# Patient Record
Sex: MALE | Race: WHITE
[De-identification: names, ages, dates, MRNs, and addresses within clinical notes are randomized per-mention and may not be internally consistent; named-entity substitution may affect disease eponyms.]

---

## 2020-01-25 ENCOUNTER — HOSPITAL ENCOUNTER (INPATIENT)
Dept: HOSPITAL 88 - ICU | Age: 60
LOS: 4 days | Discharge: HOME | DRG: 308 | End: 2020-01-29
Attending: INTERNAL MEDICINE | Admitting: INTERNAL MEDICINE
Payer: MEDICARE

## 2020-01-25 VITALS — WEIGHT: 191 LBS | BODY MASS INDEX: 28.29 KG/M2 | HEIGHT: 69 IN

## 2020-01-25 VITALS — SYSTOLIC BLOOD PRESSURE: 135 MMHG | DIASTOLIC BLOOD PRESSURE: 84 MMHG

## 2020-01-25 VITALS — SYSTOLIC BLOOD PRESSURE: 132 MMHG | DIASTOLIC BLOOD PRESSURE: 80 MMHG

## 2020-01-25 VITALS — SYSTOLIC BLOOD PRESSURE: 128 MMHG | DIASTOLIC BLOOD PRESSURE: 106 MMHG

## 2020-01-25 VITALS — DIASTOLIC BLOOD PRESSURE: 103 MMHG | SYSTOLIC BLOOD PRESSURE: 123 MMHG

## 2020-01-25 VITALS — DIASTOLIC BLOOD PRESSURE: 82 MMHG | SYSTOLIC BLOOD PRESSURE: 121 MMHG

## 2020-01-25 DIAGNOSIS — Z88.5: ICD-10-CM

## 2020-01-25 DIAGNOSIS — Z88.0: ICD-10-CM

## 2020-01-25 DIAGNOSIS — I13.0: ICD-10-CM

## 2020-01-25 DIAGNOSIS — Z87.891: ICD-10-CM

## 2020-01-25 DIAGNOSIS — I48.0: Primary | ICD-10-CM

## 2020-01-25 DIAGNOSIS — E78.5: ICD-10-CM

## 2020-01-25 DIAGNOSIS — J18.9: ICD-10-CM

## 2020-01-25 DIAGNOSIS — N18.3: ICD-10-CM

## 2020-01-25 DIAGNOSIS — J40: ICD-10-CM

## 2020-01-25 DIAGNOSIS — Z85.89: ICD-10-CM

## 2020-01-25 DIAGNOSIS — J44.9: ICD-10-CM

## 2020-01-25 DIAGNOSIS — Z86.718: ICD-10-CM

## 2020-01-25 DIAGNOSIS — I50.33: ICD-10-CM

## 2020-01-25 DIAGNOSIS — E03.9: ICD-10-CM

## 2020-01-25 LAB
ALBUMIN SERPL-MCNC: 3.3 G/DL (ref 3.5–5)
ALBUMIN/GLOB SERPL: 1 {RATIO} (ref 0.8–2)
ALP SERPL-CCNC: 70 IU/L (ref 40–150)
ALT SERPL-CCNC: 11 IU/L (ref 0–55)
ANION GAP SERPL CALC-SCNC: 14.9 MMOL/L (ref 8–16)
BASE EXCESS BLDA CALC-SCNC: -6 MMOL/L (ref -2–3)
BASOPHILS # BLD AUTO: 0 10*3/UL (ref 0–0.1)
BASOPHILS NFR BLD AUTO: 0.2 % (ref 0–1)
BUN SERPL-MCNC: 20 MG/DL (ref 7–26)
BUN/CREAT SERPL: 17 (ref 6–25)
CALCIUM SERPL-MCNC: 9.2 MG/DL (ref 8.4–10.2)
CHLORIDE SERPL-SCNC: 106 MMOL/L (ref 98–107)
CHOLEST SERPL-MCNC: 199 MD/DL (ref 0–199)
CHOLEST/HDLC SERPL: 5 {RATIO} (ref 3.9–4.7)
CO2 SERPL-SCNC: 21 MMOL/L (ref 22–29)
DEPRECATED APTT PLAS QN: 28.5 SECONDS (ref 23.8–35.5)
DEPRECATED INR PLAS: 0.86
DEPRECATED NEUTROPHILS # BLD AUTO: 10.8 10*3/UL (ref 2.1–6.9)
EGFRCR SERPLBLD CKD-EPI 2021: > 60 ML/MIN (ref 60–?)
EOSINOPHIL # BLD AUTO: 0 10*3/UL (ref 0–0.4)
EOSINOPHIL NFR BLD AUTO: 0.2 % (ref 0–6)
ERYTHROCYTE [DISTWIDTH] IN CORD BLOOD: 12.8 % (ref 11.7–14.4)
GLOBULIN PLAS-MCNC: 3.2 G/DL (ref 2.3–3.5)
GLUCOSE SERPLBLD-MCNC: 147 MG/DL (ref 74–118)
HCO3 BLDA-SCNC: 19 MMOL/L (ref 23–28)
HCT VFR BLD AUTO: 45.3 % (ref 38.2–49.6)
HDLC SERPL-MSCNC: 40 MG/DL (ref 40–60)
HGB BLD-MCNC: 14.8 G/DL (ref 14–18)
LDLC SERPL CALC-MCNC: 128 MG/DL (ref 60–130)
LYMPHOCYTES # BLD: 0.9 10*3/UL (ref 1–3.2)
LYMPHOCYTES NFR BLD AUTO: 7.5 % (ref 18–39.1)
MAGNESIUM SERPL-MCNC: 1.9 MG/DL (ref 1.3–2.1)
MCH RBC QN AUTO: 30.4 PG (ref 28–32)
MCHC RBC AUTO-ENTMCNC: 32.7 G/DL (ref 31–35)
MCV RBC AUTO: 93 FL (ref 81–99)
MONOCYTES # BLD AUTO: 0.3 10*3/UL (ref 0.2–0.8)
MONOCYTES NFR BLD AUTO: 2.6 % (ref 4.4–11.3)
NEUTS SEG NFR BLD AUTO: 89 % (ref 38.7–80)
PCO2 BLDA: 32 MMHG (ref 41–51)
PCO2 BLDA: 99 MMHG (ref 80–105)
PH BLDA: 7.39 [PH] (ref 7.31–7.41)
PLATELET # BLD AUTO: 225 X10E3/UL (ref 140–360)
POTASSIUM SERPL-SCNC: 3.9 MMOL/L (ref 3.5–5.1)
PROTHROMBIN TIME: 12.2 SECONDS (ref 11.9–14.5)
RBC # BLD AUTO: 4.87 X10E6/UL (ref 4.3–5.7)
SAO2 % BLDA: 98 % (ref 95–98)
SODIUM SERPL-SCNC: 138 MMOL/L (ref 136–145)
TRIGL SERPL-MCNC: 155 MG/DL (ref 0–149)
TSH SERPL DL<=0.005 MIU/L-ACNC: 0.84 UIU/ML (ref 0.35–4.94)

## 2020-01-25 PROCEDURE — 93306 TTE W/DOPPLER COMPLETE: CPT

## 2020-01-25 PROCEDURE — 83036 HEMOGLOBIN GLYCOSYLATED A1C: CPT

## 2020-01-25 PROCEDURE — 80053 COMPREHEN METABOLIC PANEL: CPT

## 2020-01-25 PROCEDURE — 85025 COMPLETE CBC W/AUTO DIFF WBC: CPT

## 2020-01-25 PROCEDURE — 84443 ASSAY THYROID STIM HORMONE: CPT

## 2020-01-25 PROCEDURE — 85610 PROTHROMBIN TIME: CPT

## 2020-01-25 PROCEDURE — 71045 X-RAY EXAM CHEST 1 VIEW: CPT

## 2020-01-25 PROCEDURE — 71260 CT THORAX DX C+: CPT

## 2020-01-25 PROCEDURE — 36415 COLL VENOUS BLD VENIPUNCTURE: CPT

## 2020-01-25 PROCEDURE — 36600 WITHDRAWAL OF ARTERIAL BLOOD: CPT

## 2020-01-25 PROCEDURE — 94640 AIRWAY INHALATION TREATMENT: CPT

## 2020-01-25 PROCEDURE — 80061 LIPID PANEL: CPT

## 2020-01-25 PROCEDURE — 85730 THROMBOPLASTIN TIME PARTIAL: CPT

## 2020-01-25 PROCEDURE — 80048 BASIC METABOLIC PNL TOTAL CA: CPT

## 2020-01-25 PROCEDURE — 93005 ELECTROCARDIOGRAM TRACING: CPT

## 2020-01-25 PROCEDURE — 82805 BLOOD GASES W/O2 SATURATION: CPT

## 2020-01-25 PROCEDURE — 83735 ASSAY OF MAGNESIUM: CPT

## 2020-01-25 RX ADMIN — APIXABAN SCH MG: 5 TABLET, FILM COATED ORAL at 17:00

## 2020-01-25 RX ADMIN — DRONEDARONE SCH MG: 400 TABLET, FILM COATED ORAL at 17:00

## 2020-01-25 RX ADMIN — APIXABAN SCH MG: 5 TABLET, FILM COATED ORAL at 12:46

## 2020-01-25 RX ADMIN — METOPROLOL TARTRATE SCH MG: 25 TABLET, FILM COATED ORAL at 12:46

## 2020-01-25 RX ADMIN — DRONEDARONE SCH MG: 400 TABLET, FILM COATED ORAL at 12:46

## 2020-01-25 RX ADMIN — METOPROLOL TARTRATE SCH MG: 25 TABLET, FILM COATED ORAL at 17:02

## 2020-01-25 RX ADMIN — METOPROLOL TARTRATE SCH MG: 25 TABLET, FILM COATED ORAL at 23:41
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
## 2020-01-25 NOTE — DIAGNOSTIC IMAGING REPORT
EXAMINATION:  CHEST SINGLE (PORTABLE)    



INDICATION:           

^dYSPNEA

^[id number]

^1715 



COMPARISON:  None

     

FINDINGS:

TUBES and LINES:  None.



LUNGS:  Lungs are well inflated.  Lungs are clear.   There is no evidence of

pneumonia or pulmonary edema.



PLEURA:  No pleural effusion or pneumothorax.



HEART AND MEDIASTINUM:  The cardiomediastinal silhouette is unremarkable.    



BONES AND SOFT TISSUES:  No acute osseous lesion.  Soft tissues are

unremarkable.



UPPER ABDOMEN: No free air under the diaphragm.    



IMPRESSION: 

No acute thoracic abnormality.





Signed by: Dr. JESSE York M.D. on 1/25/2020 6:11 PM

## 2020-01-25 NOTE — XMS REPORT
Patient Summary Document

                             Created on: 2020



DELPHINE CRYSTAL

External Reference #: [id number]

: 1960

Sex: Male



Demographics







                          Address                   [address] [place]  [address]

 

                          Home Phone                [phone]

 

                          Preferred Language        Unknown

 

                          Marital Status            Unknown

 

                          Worship Affiliation     Unknown

 

                          Race                      Unknown

 

                          Ethnic Group              Unknown





Author







                          [facility]                    [facility]

 

                          [facility]

 

                          Address                   Unknown

 

                          Phone                     Unavailable







Care Team Providers







                    Care Team Member Name    Role                Phone

 

                          Unavailable               Unavailable







Problems

This patient has no known problems.



Allergies, Adverse Reactions, Alerts

This patient has no known allergies or adverse reactions.



Medications

This patient has no known medications.



Encounters







             Start Date/Time    End Date/Time    Encounter Type    Admission Type    Attending [facility] Facility       Care Department     Encounter ID

 

        2020-01-15 08:25:06    2020-01-15 08:25:06    Outpatient                    [facility]     [id number]

 

        2020-01-15 07:18:30    2020-01-15 07:18:30    Outpatient                    [facility]     [id number]

 

        2020-01-15 00:00:00    2020-01-15 00:00:00    Outpatient                    [facility]     [id number]

 

        2019 10:33:34    2019 10:33:34    Outpatient                    [facility]     [id number]

 

        2019 00:00:00    2019 00:00:00    Outpatient                    [facility]     [id number]

 

        2019 07:30:17    2019 07:30:17    Outpatient                    [facility]     [id number]

 

        2019 12:17:42    2019 12:17:42    Outpatient                    [facility]     [id number]

 

        2019 10:40:40    2019 10:40:40    Outpatient                    [facility]     [id number]

 

        2019 00:00:00    2019 00:00:00    Outpatient                    [facility]     [id number]

 

        2019 12:03:38    2019 12:03:38    Outpatient                    [facility]     [id number]

 

        2019 13:37:14    2019 13:37:14    Outpatient                    [facility]     [id number]

 

        2019 09:02:52    2019 09:02:52    Outpatient                    [facility]     [id number]

 

        2019 08:56:32    2019 08:56:32    Outpatient                    [facility]     [id number]

 

        2019 08:00:29    2019 08:00:29    Outpatient                    [facility]     [id number]

## 2020-01-25 NOTE — DIAGNOSTIC IMAGING REPORT
EXAM: CT Chest WITH contrast 1/25/2020 11:59 AM

INDICATION: Pain. Pulmonary Embolism.. Atrial fibrillation. 

COMPARISON: 9/8/2014.

TECHNIQUE:

Chest was scanned utilizing a multidetector helical scanner from the lung apex

through the level of the adrenal glands without administration of IV contrast.

Coronal and sagittal reformations were obtained. Pulmonary embolism protocol

was performed.

           IV CONTRAST: 100 cc Isovue-300

           RADIATION DOSE: Total DLP: 521.29 mGy*cm

            Estimated effective dose: (DLP x 0.014 x size factor) mSv

           COMPLICATIONS: None



FINDINGS:



LINES/ TUBES: None.



LUNGS AND AIRWAYS: No filling defects within the pulmonary arterial system to

suggest pulmonary embolism as per clinical query. Patchy consolidation in the

left lung base may represent pneumonia in the proper clinical setting. Airways

are normal.



PLEURA: The pleural spaces are clear.



HEART AND MEDIASTINUM: The thyroid gland is normal.  No mediastinal, hilar or

axillary lymphadenopathy.  The heart is normal in size.. There is no

pericardial effusion.     



UPPER ABDOMEN: Limited non-contrast views of the upper abdomen show no

abnormality within the visualized liver, spleen, pancreas, or kidneys. The

adrenal glands are normal.



BONES: Lower thoracic the ICA.



SOFT TISSUES: Unremarkable.



IMPRESSION: 



1. No evidence of pulmonary embolism.



2. Patchy consolidation in the left lung base concerning for pneumonia in the

proper clinical setting





Signed by: Dr. JESSE York M.D. on 1/25/2020 6:29 PM

## 2020-01-26 VITALS — SYSTOLIC BLOOD PRESSURE: 144 MMHG | DIASTOLIC BLOOD PRESSURE: 103 MMHG

## 2020-01-26 VITALS — SYSTOLIC BLOOD PRESSURE: 123 MMHG | DIASTOLIC BLOOD PRESSURE: 80 MMHG

## 2020-01-26 VITALS — DIASTOLIC BLOOD PRESSURE: 102 MMHG | SYSTOLIC BLOOD PRESSURE: 129 MMHG

## 2020-01-26 VITALS — SYSTOLIC BLOOD PRESSURE: 141 MMHG | DIASTOLIC BLOOD PRESSURE: 88 MMHG

## 2020-01-26 VITALS — DIASTOLIC BLOOD PRESSURE: 92 MMHG | SYSTOLIC BLOOD PRESSURE: 112 MMHG

## 2020-01-26 VITALS — SYSTOLIC BLOOD PRESSURE: 132 MMHG | DIASTOLIC BLOOD PRESSURE: 106 MMHG

## 2020-01-26 VITALS — DIASTOLIC BLOOD PRESSURE: 104 MMHG | SYSTOLIC BLOOD PRESSURE: 141 MMHG

## 2020-01-26 RX ADMIN — DRONEDARONE SCH MG: 400 TABLET, FILM COATED ORAL at 16:33

## 2020-01-26 RX ADMIN — APIXABAN SCH MG: 5 TABLET, FILM COATED ORAL at 16:33

## 2020-01-26 RX ADMIN — METOPROLOL TARTRATE SCH MG: 25 TABLET, FILM COATED ORAL at 23:45

## 2020-01-26 RX ADMIN — FAMOTIDINE SCH MG: 20 TABLET, FILM COATED ORAL at 16:33

## 2020-01-26 RX ADMIN — METOPROLOL TARTRATE SCH MG: 25 TABLET, FILM COATED ORAL at 06:06

## 2020-01-26 RX ADMIN — METOPROLOL TARTRATE SCH MG: 25 TABLET, FILM COATED ORAL at 11:22

## 2020-01-26 RX ADMIN — APIXABAN SCH MG: 5 TABLET, FILM COATED ORAL at 08:25

## 2020-01-26 RX ADMIN — DRONEDARONE SCH MG: 400 TABLET, FILM COATED ORAL at 08:25

## 2020-01-26 RX ADMIN — LEVOFLOXACIN SCH MLS/HR: 5 INJECTION, SOLUTION INTRAVENOUS at 11:22
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
## 2020-01-27 VITALS — DIASTOLIC BLOOD PRESSURE: 66 MMHG | SYSTOLIC BLOOD PRESSURE: 101 MMHG

## 2020-01-27 VITALS — SYSTOLIC BLOOD PRESSURE: 127 MMHG | DIASTOLIC BLOOD PRESSURE: 99 MMHG

## 2020-01-27 VITALS — DIASTOLIC BLOOD PRESSURE: 104 MMHG | SYSTOLIC BLOOD PRESSURE: 154 MMHG

## 2020-01-27 VITALS — SYSTOLIC BLOOD PRESSURE: 131 MMHG | DIASTOLIC BLOOD PRESSURE: 88 MMHG

## 2020-01-27 VITALS — SYSTOLIC BLOOD PRESSURE: 101 MMHG | DIASTOLIC BLOOD PRESSURE: 66 MMHG

## 2020-01-27 VITALS — DIASTOLIC BLOOD PRESSURE: 102 MMHG | SYSTOLIC BLOOD PRESSURE: 151 MMHG

## 2020-01-27 LAB
ANION GAP SERPL CALC-SCNC: 12 MMOL/L (ref 8–16)
BASOPHILS # BLD AUTO: 0 10*3/UL (ref 0–0.1)
BASOPHILS NFR BLD AUTO: 0.4 % (ref 0–1)
BUN SERPL-MCNC: 17 MG/DL (ref 7–26)
BUN/CREAT SERPL: 12 (ref 6–25)
CALCIUM SERPL-MCNC: 9 MG/DL (ref 8.4–10.2)
CHLORIDE SERPL-SCNC: 104 MMOL/L (ref 98–107)
CO2 SERPL-SCNC: 29 MMOL/L (ref 22–29)
DEPRECATED NEUTROPHILS # BLD AUTO: 6.6 10*3/UL (ref 2.1–6.9)
EGFRCR SERPLBLD CKD-EPI 2021: 53 ML/MIN (ref 60–?)
EOSINOPHIL # BLD AUTO: 0.2 10*3/UL (ref 0–0.4)
EOSINOPHIL NFR BLD AUTO: 1.8 % (ref 0–6)
ERYTHROCYTE [DISTWIDTH] IN CORD BLOOD: 13.6 % (ref 11.7–14.4)
GLUCOSE SERPLBLD-MCNC: 83 MG/DL (ref 74–118)
HCT VFR BLD AUTO: 44.8 % (ref 38.2–49.6)
HGB BLD-MCNC: 14.3 G/DL (ref 14–18)
LYMPHOCYTES # BLD: 2.6 10*3/UL (ref 1–3.2)
LYMPHOCYTES NFR BLD AUTO: 24.7 % (ref 18–39.1)
MCH RBC QN AUTO: 30.6 PG (ref 28–32)
MCHC RBC AUTO-ENTMCNC: 31.9 G/DL (ref 31–35)
MCV RBC AUTO: 95.9 FL (ref 81–99)
MONOCYTES # BLD AUTO: 1.1 10*3/UL (ref 0.2–0.8)
MONOCYTES NFR BLD AUTO: 10.5 % (ref 4.4–11.3)
NEUTS SEG NFR BLD AUTO: 62 % (ref 38.7–80)
PLATELET # BLD AUTO: 216 X10E3/UL (ref 140–360)
POTASSIUM SERPL-SCNC: 4 MMOL/L (ref 3.5–5.1)
RBC # BLD AUTO: 4.67 X10E6/UL (ref 4.3–5.7)
SODIUM SERPL-SCNC: 141 MMOL/L (ref 136–145)

## 2020-01-27 RX ADMIN — Medication SCH MG: at 20:53

## 2020-01-27 RX ADMIN — FAMOTIDINE SCH MG: 20 TABLET, FILM COATED ORAL at 16:51

## 2020-01-27 RX ADMIN — Medication SCH MG: at 10:37

## 2020-01-27 RX ADMIN — FAMOTIDINE SCH MG: 20 TABLET, FILM COATED ORAL at 07:15

## 2020-01-27 RX ADMIN — DRONEDARONE SCH MG: 400 TABLET, FILM COATED ORAL at 08:05

## 2020-01-27 RX ADMIN — METOPROLOL TARTRATE SCH MG: 25 TABLET, FILM COATED ORAL at 08:05

## 2020-01-27 RX ADMIN — LEVOFLOXACIN SCH MLS/HR: 5 INJECTION, SOLUTION INTRAVENOUS at 10:38

## 2020-01-27 RX ADMIN — APIXABAN SCH MG: 5 TABLET, FILM COATED ORAL at 16:51

## 2020-01-27 RX ADMIN — APIXABAN SCH MG: 5 TABLET, FILM COATED ORAL at 08:06

## 2020-01-27 RX ADMIN — PREGABALIN SCH MG: 50 CAPSULE ORAL at 20:53

## 2020-01-28 VITALS — DIASTOLIC BLOOD PRESSURE: 76 MMHG | SYSTOLIC BLOOD PRESSURE: 107 MMHG

## 2020-01-28 VITALS — DIASTOLIC BLOOD PRESSURE: 71 MMHG | SYSTOLIC BLOOD PRESSURE: 103 MMHG

## 2020-01-28 VITALS — DIASTOLIC BLOOD PRESSURE: 63 MMHG | SYSTOLIC BLOOD PRESSURE: 121 MMHG

## 2020-01-28 VITALS — DIASTOLIC BLOOD PRESSURE: 80 MMHG | SYSTOLIC BLOOD PRESSURE: 116 MMHG

## 2020-01-28 VITALS — DIASTOLIC BLOOD PRESSURE: 89 MMHG | SYSTOLIC BLOOD PRESSURE: 125 MMHG

## 2020-01-28 VITALS — SYSTOLIC BLOOD PRESSURE: 117 MMHG | DIASTOLIC BLOOD PRESSURE: 93 MMHG

## 2020-01-28 VITALS — DIASTOLIC BLOOD PRESSURE: 100 MMHG | SYSTOLIC BLOOD PRESSURE: 117 MMHG

## 2020-01-28 VITALS — SYSTOLIC BLOOD PRESSURE: 104 MMHG | DIASTOLIC BLOOD PRESSURE: 82 MMHG

## 2020-01-28 VITALS — DIASTOLIC BLOOD PRESSURE: 63 MMHG | SYSTOLIC BLOOD PRESSURE: 92 MMHG

## 2020-01-28 VITALS — SYSTOLIC BLOOD PRESSURE: 103 MMHG | DIASTOLIC BLOOD PRESSURE: 71 MMHG

## 2020-01-28 LAB
ALBUMIN SERPL-MCNC: 3.1 G/DL (ref 3.5–5)
ALBUMIN/GLOB SERPL: 0.9 {RATIO} (ref 0.8–2)
ALP SERPL-CCNC: 61 IU/L (ref 40–150)
ALT SERPL-CCNC: 10 IU/L (ref 0–55)
ANION GAP SERPL CALC-SCNC: 14.8 MMOL/L (ref 8–16)
BASOPHILS # BLD AUTO: 0.1 10*3/UL (ref 0–0.1)
BASOPHILS NFR BLD AUTO: 0.4 % (ref 0–1)
BUN SERPL-MCNC: 21 MG/DL (ref 7–26)
BUN/CREAT SERPL: 12 (ref 6–25)
CALCIUM SERPL-MCNC: 9.2 MG/DL (ref 8.4–10.2)
CHLORIDE SERPL-SCNC: 105 MMOL/L (ref 98–107)
CO2 SERPL-SCNC: 27 MMOL/L (ref 22–29)
DEPRECATED NEUTROPHILS # BLD AUTO: 8 10*3/UL (ref 2.1–6.9)
EGFRCR SERPLBLD CKD-EPI 2021: 41 ML/MIN (ref 60–?)
EOSINOPHIL # BLD AUTO: 0.2 10*3/UL (ref 0–0.4)
EOSINOPHIL NFR BLD AUTO: 2.1 % (ref 0–6)
ERYTHROCYTE [DISTWIDTH] IN CORD BLOOD: 13.8 % (ref 11.7–14.4)
GLOBULIN PLAS-MCNC: 3.4 G/DL (ref 2.3–3.5)
GLUCOSE SERPLBLD-MCNC: 99 MG/DL (ref 74–118)
HCT VFR BLD AUTO: 48.6 % (ref 38.2–49.6)
HGB BLD-MCNC: 15.6 G/DL (ref 14–18)
LYMPHOCYTES # BLD: 2.1 10*3/UL (ref 1–3.2)
LYMPHOCYTES NFR BLD AUTO: 17.9 % (ref 18–39.1)
MCH RBC QN AUTO: 31 PG (ref 28–32)
MCHC RBC AUTO-ENTMCNC: 32.1 G/DL (ref 31–35)
MCV RBC AUTO: 96.6 FL (ref 81–99)
MONOCYTES # BLD AUTO: 1.1 10*3/UL (ref 0.2–0.8)
MONOCYTES NFR BLD AUTO: 9.8 % (ref 4.4–11.3)
NEUTS SEG NFR BLD AUTO: 69.2 % (ref 38.7–80)
PLATELET # BLD AUTO: 253 X10E3/UL (ref 140–360)
POTASSIUM SERPL-SCNC: 3.8 MMOL/L (ref 3.5–5.1)
RBC # BLD AUTO: 5.03 X10E6/UL (ref 4.3–5.7)
SODIUM SERPL-SCNC: 143 MMOL/L (ref 136–145)

## 2020-01-28 RX ADMIN — METOPROLOL TARTRATE SCH MG: 25 TABLET, FILM COATED ORAL at 18:14

## 2020-01-28 RX ADMIN — FAMOTIDINE SCH MG: 20 TABLET, FILM COATED ORAL at 16:36

## 2020-01-28 RX ADMIN — Medication SCH MG: at 21:15

## 2020-01-28 RX ADMIN — PREGABALIN SCH MG: 50 CAPSULE ORAL at 21:15

## 2020-01-28 RX ADMIN — LEVOTHYROXINE SODIUM SCH MCG: 25 TABLET ORAL at 05:24

## 2020-01-28 RX ADMIN — Medication SCH MG: at 09:21

## 2020-01-28 RX ADMIN — METOPROLOL TARTRATE SCH MG: 25 TABLET, FILM COATED ORAL at 09:21

## 2020-01-28 RX ADMIN — CEFTRIAXONE SCH MLS/HR: 100 INJECTION, POWDER, FOR SOLUTION INTRAVENOUS at 11:44

## 2020-01-28 RX ADMIN — AZITHROMYCIN SCH MG: 250 TABLET, FILM COATED ORAL at 11:45

## 2020-01-28 RX ADMIN — DRONEDARONE SCH MG: 400 TABLET, FILM COATED ORAL at 18:14

## 2020-01-28 RX ADMIN — PANTOPRAZOLE SODIUM SCH MG: 40 TABLET, DELAYED RELEASE ORAL at 07:27

## 2020-01-28 RX ADMIN — Medication SCH MG: at 10:15

## 2020-01-28 RX ADMIN — FAMOTIDINE SCH MG: 20 TABLET, FILM COATED ORAL at 07:28

## 2020-01-28 RX ADMIN — LEVALBUTEROL HYDROCHLORIDE PRN MG: 0.63 SOLUTION RESPIRATORY (INHALATION) at 07:38

## 2020-01-28 RX ADMIN — OLMESARTAN MEDOXOMIL SCH MG: 20 TABLET, FILM COATED ORAL at 09:19

## 2020-01-28 RX ADMIN — APIXABAN SCH MG: 5 TABLET, FILM COATED ORAL at 09:21

## 2020-01-28 RX ADMIN — APIXABAN SCH MG: 5 TABLET, FILM COATED ORAL at 18:14

## 2020-01-28 NOTE — NUR
Bedside report received from Yin PACHECO RN. Pt received sitting up in bed, AAOx4, room air, 
no distress noted, pt reports no pain or discomfort. Care plan reviewed, no family present. 
Call light in reach of the pt, bed in lowest and locked position.
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
## 2020-01-29 VITALS — DIASTOLIC BLOOD PRESSURE: 64 MMHG | SYSTOLIC BLOOD PRESSURE: 107 MMHG

## 2020-01-29 VITALS — SYSTOLIC BLOOD PRESSURE: 111 MMHG | DIASTOLIC BLOOD PRESSURE: 76 MMHG

## 2020-01-29 VITALS — SYSTOLIC BLOOD PRESSURE: 123 MMHG | DIASTOLIC BLOOD PRESSURE: 75 MMHG

## 2020-01-29 VITALS — SYSTOLIC BLOOD PRESSURE: 122 MMHG | DIASTOLIC BLOOD PRESSURE: 78 MMHG

## 2020-01-29 LAB
ALBUMIN SERPL-MCNC: 3.1 G/DL (ref 3.5–5)
ALBUMIN/GLOB SERPL: 1 {RATIO} (ref 0.8–2)
ALP SERPL-CCNC: 50 IU/L (ref 40–150)
ALT SERPL-CCNC: 12 IU/L (ref 0–55)
ANION GAP SERPL CALC-SCNC: 13.3 MMOL/L (ref 8–16)
BASOPHILS # BLD AUTO: 0.1 10*3/UL (ref 0–0.1)
BASOPHILS NFR BLD AUTO: 0.5 % (ref 0–1)
BUN SERPL-MCNC: 24 MG/DL (ref 7–26)
BUN/CREAT SERPL: 14 (ref 6–25)
CALCIUM SERPL-MCNC: 9 MG/DL (ref 8.4–10.2)
CHLORIDE SERPL-SCNC: 103 MMOL/L (ref 98–107)
CO2 SERPL-SCNC: 28 MMOL/L (ref 22–29)
DEPRECATED NEUTROPHILS # BLD AUTO: 6.9 10*3/UL (ref 2.1–6.9)
EGFRCR SERPLBLD CKD-EPI 2021: 42 ML/MIN (ref 60–?)
EOSINOPHIL # BLD AUTO: 0.3 10*3/UL (ref 0–0.4)
EOSINOPHIL NFR BLD AUTO: 3 % (ref 0–6)
ERYTHROCYTE [DISTWIDTH] IN CORD BLOOD: 13.7 % (ref 11.7–14.4)
GLOBULIN PLAS-MCNC: 3.2 G/DL (ref 2.3–3.5)
GLUCOSE SERPLBLD-MCNC: 85 MG/DL (ref 74–118)
HCT VFR BLD AUTO: 46.2 % (ref 38.2–49.6)
HGB BLD-MCNC: 14.7 G/DL (ref 14–18)
LYMPHOCYTES # BLD: 2.3 10*3/UL (ref 1–3.2)
LYMPHOCYTES NFR BLD AUTO: 21.4 % (ref 18–39.1)
MCH RBC QN AUTO: 30.4 PG (ref 28–32)
MCHC RBC AUTO-ENTMCNC: 31.8 G/DL (ref 31–35)
MCV RBC AUTO: 95.5 FL (ref 81–99)
MONOCYTES # BLD AUTO: 1.1 10*3/UL (ref 0.2–0.8)
MONOCYTES NFR BLD AUTO: 10.2 % (ref 4.4–11.3)
NEUTS SEG NFR BLD AUTO: 64.2 % (ref 38.7–80)
PLATELET # BLD AUTO: 229 X10E3/UL (ref 140–360)
POTASSIUM SERPL-SCNC: 4.3 MMOL/L (ref 3.5–5.1)
RBC # BLD AUTO: 4.84 X10E6/UL (ref 4.3–5.7)
SODIUM SERPL-SCNC: 140 MMOL/L (ref 136–145)

## 2020-01-29 RX ADMIN — CEFTRIAXONE SCH MLS/HR: 100 INJECTION, POWDER, FOR SOLUTION INTRAVENOUS at 12:00

## 2020-01-29 RX ADMIN — DRONEDARONE SCH MG: 400 TABLET, FILM COATED ORAL at 08:44

## 2020-01-29 RX ADMIN — OLMESARTAN MEDOXOMIL SCH MG: 20 TABLET, FILM COATED ORAL at 08:44

## 2020-01-29 RX ADMIN — Medication SCH MG: at 08:44

## 2020-01-29 RX ADMIN — PANTOPRAZOLE SODIUM SCH MG: 40 TABLET, DELAYED RELEASE ORAL at 08:44

## 2020-01-29 RX ADMIN — METOPROLOL TARTRATE SCH MG: 25 TABLET, FILM COATED ORAL at 08:44

## 2020-01-29 RX ADMIN — Medication SCH MG: at 10:44

## 2020-01-29 RX ADMIN — LEVOTHYROXINE SODIUM SCH MCG: 25 TABLET ORAL at 05:49

## 2020-01-29 RX ADMIN — AZITHROMYCIN SCH MG: 250 TABLET, FILM COATED ORAL at 08:44

## 2020-01-29 RX ADMIN — APIXABAN SCH MG: 5 TABLET, FILM COATED ORAL at 08:44

## 2020-01-29 RX ADMIN — FAMOTIDINE SCH MG: 20 TABLET, FILM COATED ORAL at 08:44

## 2020-01-29 NOTE — NUR
PATIENT DISCHARGED FROM FACILITY. PATIENT GATHERED ALL PERSONAL BELONGINGS, DISCHARGE 
INSTRUCTIONS, AND FOLLOW UP INFORMATION. PATIENT LEFT UNIT IN WHEELCHAIR AND WENT HOME VIA 
PRIVATE AUTO. NO S/S OF DISTRESS WHEN LEAVING FACILITY.

## 2020-01-29 NOTE — NUR
RECEIVED PATIENT FROM ICU AT THIS TIME. LUNG SOUNDS CLEAR. BOWEL SOUNDS ACTIVE. IV TO R FA 
20G ASYMPTOMATIC, INTACT, AND PATENT. PEDAL PULSES PALPABLE. NO PAIN REPORTED. VITAL SIGNS 
STABLE. BED LOCKED IN LOWEST POSITION, SIDE RAILS UPX2, CALL LIGHT IN REACH.

## 2020-01-30 NOTE — DISCHARGE SUMMARY
HISTORY:  He is a 59-year-old male patient presented to the emergency room with

palpitation and shortness of breath and a cough. 

 

ADMITTING IMPRESSION DIAGNOSES:  Patient was 59-year-old male patient was admitted with

diagnoses of atrial fibrillation with rapid ventricular rate and left lower lobe

pneumonia, severe bronchitis and hypertensive heart disease, the parotid cancer and

anxiety, stress. 

 

HOSPITAL COURSE SUMMARY:  The patient was admitted in the intensive care unit.  Patient

was given IV beta blocker and Cardiology and consult done Dr. Corado.  Patient's rate

was controlled with beta blocker, metoprolol, and patient was also given Eliquis and

dose of Lovenox was given in the emergency room and the patient was started also on IV

antibiotic, Levaquin.  The patient had improved, but the patient continued to have

persistent cough.  A Pulmonary consultation was done.  Dr. Chan had seen the patient

and patient's antibiotic was changed from Levaquin to Rocephin and azithromycin.  The

patient was transferred out of ICU and moved to the telemetry unit.  The patient was

also given the nebulizer treatment. 

 

IMAGING:  Patient had a chest x-ray done and a CT scan of the chest done, which showed

left lower lobe infiltrate, possible pneumonia.  Echocardiogram was also done, which

showed moderate LVH and 50% to 55% ejection fraction and diastolic dysfunction and left

atrial moderately dilatation.  The patient has a mild-to- moderate MR and on CT scan of

the chest, there was no evidence of pulmonary embolism.  Now upon stabilization, the

patient will be discharged home on a Xopenex, Tessalon Perles, Eliquis, metoprolol,

Multaq, __________, and Zithromax. 

 

FOLLOWUP:  The patient will be followed up as outpatient with me as well as Cardiology.

 

 

 

 

______________________________

MD JC Rascon/TENZIN

D:  01/29/2020 10:04:44

T:  01/30/2020 07:27:02

Job #:  [id number]

## 2024-10-16 ENCOUNTER — HOSPITAL ENCOUNTER (OUTPATIENT)
Dept: HOSPITAL 88 - CT | Age: 64
End: 2024-10-16
Attending: INTERNAL MEDICINE
Payer: COMMERCIAL

## 2024-10-16 DIAGNOSIS — G45.1: Primary | ICD-10-CM

## 2024-10-16 LAB
BUN SERPL-MCNC: 17 MG/DL (ref 7–26)
BUN/CREAT SERPL: 13 (ref 6–25)
EGFRCR SERPLBLD CKD-EPI 2021: 61 ML/MIN (ref 60–?)

## 2024-10-16 PROCEDURE — 84520 ASSAY OF UREA NITROGEN: CPT

## 2024-10-16 PROCEDURE — 82565 ASSAY OF CREATININE: CPT

## 2024-10-16 PROCEDURE — 70498 CT ANGIOGRAPHY NECK: CPT

## 2024-10-16 PROCEDURE — 36415 COLL VENOUS BLD VENIPUNCTURE: CPT
